# Patient Record
Sex: FEMALE | Race: WHITE
[De-identification: names, ages, dates, MRNs, and addresses within clinical notes are randomized per-mention and may not be internally consistent; named-entity substitution may affect disease eponyms.]

---

## 2017-06-08 ENCOUNTER — HOSPITAL ENCOUNTER (OUTPATIENT)
Dept: HOSPITAL 58 - AMBL | Age: 47
End: 2017-06-08
Attending: INTERNAL MEDICINE

## 2017-06-08 VITALS — BODY MASS INDEX: 34.7 KG/M2

## 2017-06-08 DIAGNOSIS — M54.2: Primary | ICD-10-CM

## 2017-10-16 ENCOUNTER — HOSPITAL ENCOUNTER (OUTPATIENT)
Dept: HOSPITAL 58 - AMBL | Age: 47
Discharge: HOME | End: 2017-10-16
Attending: INTERNAL MEDICINE

## 2017-10-16 VITALS — BODY MASS INDEX: 34.7 KG/M2

## 2017-10-16 DIAGNOSIS — R11.10: Primary | ICD-10-CM

## 2017-10-16 DIAGNOSIS — R00.0: ICD-10-CM

## 2017-10-16 DIAGNOSIS — R53.1: ICD-10-CM

## 2017-12-28 ENCOUNTER — HOSPITAL ENCOUNTER (OUTPATIENT)
Dept: HOSPITAL 58 - RAD | Age: 47
Discharge: HOME | End: 2017-12-28
Attending: PAIN MEDICINE

## 2017-12-28 VITALS — BODY MASS INDEX: 34.7 KG/M2

## 2017-12-28 DIAGNOSIS — M47.812: ICD-10-CM

## 2017-12-28 DIAGNOSIS — M75.41: Primary | ICD-10-CM

## 2017-12-28 NOTE — DI
Exam:  Six x-rays of the cervical spine. 

  

Comparison:  Spondylolisthesis and radiculopathy 

  

Reason for exam:  Neck pain. 

  

FINDINGS:  No acute fracture or malalignment.  The vertebral body and intervertebral body disc space 
heights are relatively well maintained.  There is a normal appearing cervical lordotic curve.  Mild d
egenerative disease is seen with intervertebral body disc space height narrowing. 

  

The prevertebral soft tissues are within normal limits. The dens is intact on the open-mouth odontoid
 view. 

  

Impression: 

  

No acute fracture or listhesis in the cervical spine with mild degenerative disease.  If clinical con
cern exists for radiculopathy or myelopathy, MRI may be performed.

## 2017-12-28 NOTE — DI
EXAM:  Right shoulder three view 

  

HISTORY:  Right shoulder impingement 

  

COMPARISON:  07/14/2015 

  

FINDINGS:  The bones are normal. The glenohumeral joint and acromioclavicular joint are normal. No fo
terence soft tissue abnormality. Visualized portion of the chest is normal. 

  

IMPERSSION:  Normal examination.

## 2018-02-21 ENCOUNTER — HOSPITAL ENCOUNTER (OUTPATIENT)
Dept: HOSPITAL 58 - LAB | Age: 48
Discharge: HOME | End: 2018-02-21
Attending: PHYSICIAN ASSISTANT

## 2018-02-21 VITALS — BODY MASS INDEX: 34.7 KG/M2

## 2018-02-21 DIAGNOSIS — K21.9: Primary | ICD-10-CM

## 2018-02-21 DIAGNOSIS — R73.9: ICD-10-CM

## 2018-02-21 PROCEDURE — 80053 COMPREHEN METABOLIC PANEL: CPT

## 2018-02-21 PROCEDURE — 80061 LIPID PANEL: CPT

## 2018-02-21 PROCEDURE — 85025 COMPLETE CBC W/AUTO DIFF WBC: CPT

## 2018-02-21 PROCEDURE — 36415 COLL VENOUS BLD VENIPUNCTURE: CPT

## 2018-08-01 ENCOUNTER — HOSPITAL ENCOUNTER (OUTPATIENT)
Dept: HOSPITAL 58 - RAD | Age: 48
Discharge: HOME | End: 2018-08-01
Attending: PAIN MEDICINE

## 2018-08-01 VITALS — BODY MASS INDEX: 34.7 KG/M2

## 2018-08-01 DIAGNOSIS — E78.5: ICD-10-CM

## 2018-08-01 DIAGNOSIS — M47.812: Primary | ICD-10-CM

## 2018-08-01 PROCEDURE — 80053 COMPREHEN METABOLIC PANEL: CPT

## 2018-08-01 PROCEDURE — 36415 COLL VENOUS BLD VENIPUNCTURE: CPT

## 2018-08-02 NOTE — MRI
EXAM:  Cervical spine MRI without contrast. 

  

HISTORY:  Cervical facet joint arthropathy. 

  

COMPARISON:  Cervical spine radiographs 12/28/2017. 

  

TECHNIQUE:  Multiplanar, multisequence MR images were acquired cervical spine without contrast. 

  

FINDINGS:  The craniocervical junction is normal and the cervical cord has normal signal intensity. T
here is minor mid cervical dextroscoliosis centered at C4-5 and there is 1 mm retrolisthesis of C4 on
 C5 and C5 on C6.  The cervical vertebra are normal in height and intrinsic bone marrow signal.  Aníbal
r ventral spondylosis is present at C6-7.  There are no paravertebral masses.  The thyroid gland is m
ildly enlarged with a nodular configuration .  The isthmus measures 1.9 cm in diameter.   Visualized 
lung apices are clear. 

  

C2-3:  There is a small posterior disc bulge without central canal stenosis.  Neural foramina are pat
ent. 

  

C3-4:  There is a minor posterior disc osteophyte complex without central canal stenosis.  Minor left
 uncovertebral hypertrophy and facet arthropathy is present.  There is mild left foraminal stenosis. 


  

C4-5:  There is a minor dorsal spondylotic ridge that is asymmetric to the right.  There is no centra
l canal stenosis or foraminal stenosis. 

  

C5-6:  There is a mild posterior disc osteophyte complex, ligamentum flavum hypertrophy and minor rig
ht uncovertebral hypertrophy.  There is mild to moderate right neural foraminal stenosis.  There is n
o central canal stenosis. 

  

C6-7:  There is a mild diffuse disc bulge and right greater than left uncovertebral hypertrophy. This
 causes mild right and possibly minor left foraminal stenosis.  There is no central canal stenosis. 

  

C7-T1:  There is a minor posterior disc bulge that is considered physiologic.  There is no central ca
nal stenosis or foraminal stenosis. 

  

IMPRESSION: 

1.  Minor cervical degenerative spondylosis without central canal stenosis or disc herniations. 

2.  Mild to moderate right C5-6 neural foraminal stenosis. 

3.  Mildly enlarged nodular thyroid gland suggestive of a goiter.  Thyroid ultrasound is advised to b
mala define the anatomy.

## 2018-09-19 ENCOUNTER — HOSPITAL ENCOUNTER (OUTPATIENT)
Dept: HOSPITAL 58 - LAB | Age: 48
Discharge: HOME | End: 2018-09-19
Attending: NURSE PRACTITIONER

## 2018-09-19 VITALS — BODY MASS INDEX: 34.7 KG/M2

## 2018-09-19 DIAGNOSIS — K92.1: ICD-10-CM

## 2018-09-19 DIAGNOSIS — R19.5: ICD-10-CM

## 2018-09-19 DIAGNOSIS — K59.00: Primary | ICD-10-CM

## 2018-09-19 DIAGNOSIS — R39.11: ICD-10-CM

## 2018-09-19 PROCEDURE — 80053 COMPREHEN METABOLIC PANEL: CPT

## 2018-09-19 PROCEDURE — 36415 COLL VENOUS BLD VENIPUNCTURE: CPT

## 2018-09-19 PROCEDURE — 82272 OCCULT BLD FECES 1-3 TESTS: CPT

## 2018-09-19 PROCEDURE — 85025 COMPLETE CBC W/AUTO DIFF WBC: CPT

## 2018-09-21 ENCOUNTER — HOSPITAL ENCOUNTER (OUTPATIENT)
Dept: HOSPITAL 58 - LAB | Age: 48
Discharge: HOME | End: 2018-09-21
Attending: NURSE PRACTITIONER

## 2018-09-21 VITALS — BODY MASS INDEX: 34.7 KG/M2

## 2018-09-21 DIAGNOSIS — R39.11: ICD-10-CM

## 2018-09-21 DIAGNOSIS — R19.5: ICD-10-CM

## 2018-09-21 DIAGNOSIS — K59.00: Primary | ICD-10-CM

## 2018-09-21 DIAGNOSIS — K92.1: ICD-10-CM
